# Patient Record
Sex: MALE | Race: BLACK OR AFRICAN AMERICAN | Employment: FULL TIME | URBAN - METROPOLITAN AREA
[De-identification: names, ages, dates, MRNs, and addresses within clinical notes are randomized per-mention and may not be internally consistent; named-entity substitution may affect disease eponyms.]

---

## 2020-05-28 ENCOUNTER — HOSPITAL ENCOUNTER (EMERGENCY)
Age: 45
Discharge: HOME | End: 2020-05-28
Attending: EMERGENCY MEDICINE
Payer: COMMERCIAL

## 2020-05-28 VITALS
HEART RATE: 94 BPM | HEIGHT: 71 IN | DIASTOLIC BLOOD PRESSURE: 80 MMHG | BODY MASS INDEX: 24.99 KG/M2 | OXYGEN SATURATION: 100 % | WEIGHT: 178.5 LBS | SYSTOLIC BLOOD PRESSURE: 136 MMHG | RESPIRATION RATE: 14 BRPM | TEMPERATURE: 98.5 F

## 2020-05-28 PROCEDURE — 99282 EMERGENCY DEPT VISIT SF MDM: CPT

## 2020-05-28 RX ORDER — IBUPROFEN 600 MG/1
600 TABLET ORAL EVERY 8 HOURS PRN
Qty: 20 TABLET | Refills: 0 | Status: SHIPPED | OUTPATIENT
Start: 2020-05-28

## 2020-05-28 RX ORDER — PENICILLIN V POTASSIUM 500 MG/1
500 TABLET ORAL 4 TIMES DAILY
Qty: 40 TABLET | Refills: 0 | Status: SHIPPED | OUTPATIENT
Start: 2020-05-28 | End: 2020-06-07

## 2020-05-28 ASSESSMENT — PAIN DESCRIPTION - PAIN TYPE
TYPE: ACUTE PAIN
TYPE: ACUTE PAIN

## 2020-05-28 ASSESSMENT — PAIN DESCRIPTION - LOCATION
LOCATION: TEETH
LOCATION: TEETH

## 2020-05-28 ASSESSMENT — PAIN SCALES - GENERAL
PAINLEVEL_OUTOF10: 5
PAINLEVEL_OUTOF10: 6

## 2020-05-28 ASSESSMENT — PAIN DESCRIPTION - DESCRIPTORS
DESCRIPTORS: ACHING
DESCRIPTORS: ACHING

## 2020-05-28 NOTE — ED NOTES
Patient given prescription,  discharge instructions verbal and written, patient verbalized understanding. Alert/oriented X4, Clear speech.   Patient exhibits no distress, ambulates with steady gait per self leaving unit, no further request.     Munira Valente RN  05/28/20 9851

## 2020-05-30 NOTE — ED PROVIDER NOTES
TRIAGE CHIEF COMPLAINT:   Chief Complaint   Patient presents with    Dental Pain         HPI: Flakito Preciado is a 39 y.o. male who presents to the Emergency Department with complaint of of a painful left lower molar for 1 week. He states the tooth previously broke off. He does not have a dentist.  Denies fever or chills. Denies earache or sore throat. No neck pain. Denies chest pain or shortness of breath. REVIEW OF SYSTEMS:  6 systems reviewed. Pertinent positives per HPI. Otherwise noted to be negative. Nursing notes reviewed and agree with above. Past medical/surgical history reviewed. MEDICATIONS   Discharge Medication List as of 5/28/2020 12:31 PM      START taking these medications    Details   penicillin v potassium (VEETID) 500 MG tablet Take 1 tablet by mouth 4 times daily for 10 days, Disp-40 tablet, R-0Print      ibuprofen (IBU) 600 MG tablet Take 1 tablet by mouth every 8 hours as needed for Pain or Fever (with food), Disp-20 tablet, R-0Print         CONTINUE these medications which have NOT CHANGED    Details   FLUoxetine HCl (PROZAC PO) Take 50 mg by mouth daily Historical Med               ALLERGIES No Known Allergies      /80   Pulse 94   Temp 98.5 °F (36.9 °C)   Resp 14   Ht 5' 11\" (1.803 m)   Wt 81 kg (178 lb 8 oz)   SpO2 100%   BMI 24.90 kg/m²   General:  No acute distress. Non toxic appearance  Head:   Normocephalic and atraumatic  Eyes:   Conjunctiva clear, TIERRA, EOM's intact. ENT:   No facial swelling or trismus. TMs are normal.  Nose is clear. Oropharynx shows a fracture of the left lower molar with some dental decay. No evidence of abscess. Tooth is tender to percussion. The floor the mouth is normal.  Neck:   Supple. No adenopathy. Lungs/Chest:  No respiratory distress  CVS:   Regular rate and rhythm  Extremities:  Full range of motion  Skin:   No rashes or lesions to exposed skin  Neuro:  Alert and OX3. Speech clear and appropriate.  No extremity

## 2021-02-10 ENCOUNTER — HOSPITAL ENCOUNTER (EMERGENCY)
Age: 46
Discharge: HOME | End: 2021-02-10
Attending: EMERGENCY MEDICINE

## 2021-02-10 VITALS
OXYGEN SATURATION: 100 % | TEMPERATURE: 98.2 F | RESPIRATION RATE: 18 BRPM | SYSTOLIC BLOOD PRESSURE: 144 MMHG | HEART RATE: 127 BPM | BODY MASS INDEX: 27.3 KG/M2 | HEIGHT: 71 IN | WEIGHT: 195 LBS | DIASTOLIC BLOOD PRESSURE: 101 MMHG

## 2021-02-10 DIAGNOSIS — S61.011A LACERATION OF RIGHT THUMB WITHOUT FOREIGN BODY WITHOUT DAMAGE TO NAIL, INITIAL ENCOUNTER: Primary | ICD-10-CM

## 2021-02-10 PROCEDURE — 6360000002 HC RX W HCPCS: Performed by: EMERGENCY MEDICINE

## 2021-02-10 PROCEDURE — 90715 TDAP VACCINE 7 YRS/> IM: CPT | Performed by: EMERGENCY MEDICINE

## 2021-02-10 PROCEDURE — 90471 IMMUNIZATION ADMIN: CPT | Performed by: EMERGENCY MEDICINE

## 2021-02-10 PROCEDURE — 99283 EMERGENCY DEPT VISIT LOW MDM: CPT

## 2021-02-10 PROCEDURE — 12001 RPR S/N/AX/GEN/TRNK 2.5CM/<: CPT

## 2021-02-10 RX ORDER — BACITRACIN ZINC AND POLYMYXIN B SULFATE 500; 1000 [USP'U]/G; [USP'U]/G
OINTMENT TOPICAL
Qty: 1 TUBE | Refills: 1 | Status: SHIPPED | OUTPATIENT
Start: 2021-02-10 | End: 2021-02-17

## 2021-02-10 RX ADMIN — TETANUS TOXOID, REDUCED DIPHTHERIA TOXOID AND ACELLULAR PERTUSSIS VACCINE, ADSORBED 0.5 ML: 5; 2.5; 8; 8; 2.5 SUSPENSION INTRAMUSCULAR at 22:25

## 2021-02-10 ASSESSMENT — PAIN SCALES - GENERAL: PAINLEVEL_OUTOF10: 0

## 2021-02-10 ASSESSMENT — PAIN DESCRIPTION - ORIENTATION: ORIENTATION: RIGHT

## 2021-02-10 ASSESSMENT — PAIN DESCRIPTION - DESCRIPTORS: DESCRIPTORS: ACHING

## 2021-02-10 ASSESSMENT — PAIN DESCRIPTION - LOCATION: LOCATION: FINGER (COMMENT WHICH ONE)

## 2021-02-11 NOTE — ED TRIAGE NOTES
46y/o male with right thumb laceration. Pt states while cutting a callous on his foot the knife slipped and accidentally lacerated his right thumb. +radial pulse strong +blood.

## 2021-02-11 NOTE — ED PROVIDER NOTES
[address] [facility]  eMERGENCY dEPARTMENT eNCOUnter      Pt Name: Hedy Alfredo  MRN: [id number]  [place] 1975  Date of evaluation: 2/10/2021  Provider: Betty Corona MD  PCP: No primary care provider on file. CHIEF COMPLAINT       Chief Complaint   Patient presents with    Laceration     right thumb       HISTORY OFPRESENT ILLNESS   (Location/Symptom, Timing/Onset, Context/Setting, Quality, Duration, Modifying Factors,Severity)  Note limiting factors. Hedy Alfredo is a 39 y.o. male trying to shave down a callus with a sharp tool and inadvertently lacerated the lateral aspect of his right thumb he is right-hand dominant. The patient denies any pain bleeding is well controlled. Tetanus status is unknown    Nursing Notes were all reviewed and agreed with or any disagreements were addressed  in the HPI. REVIEW OF SYSTEMS    (2-9 systems for level 4, 10 or more for level 5)     Review of Systems    Positives and Pertinent negatives as per HPI. Except as noted above in the ROS, all other systems were reviewed andnegative. PASTMEDICAL HISTORY     Past Medical History:   Diagnosis Date    Anxiety     Depression          SURGICAL HISTORY     No past surgical history on file. CURRENT MEDICATIONS       Previous Medications    FLUOXETINE HCL (PROZAC PO)    Take 50 mg by mouth daily     IBUPROFEN (IBU) 600 MG TABLET    Take 1 tablet by mouth every 8 hours as needed for Pain or Fever (with food)       ALLERGIES     Patient has no known allergies. FAMILY HISTORY     No family history on file.        SOCIAL HISTORY       Social History     Socioeconomic History    Marital status:      Spouse name: Not on file    Number of children: Not on file    Years of education: Not on file    Highest education level: Not on file   Occupational History    Not on file   Social Needs    Financial resource strain: Not on file    Food insecurity     Worry: Not on file     Inability: Not on file    Transportation needs     Medical: Not on file     Non-medical: Not on file   Tobacco Use    Smoking status: Current Every Day Smoker    Smokeless tobacco: Never Used   Substance and Sexual Activity    Alcohol use: Yes     Comment: social    Drug use: Not Currently    Sexual activity: Not on file   Lifestyle    Physical activity     Days per week: Not on file     Minutes per session: Not on file    Stress: Not on file   Relationships    Social connections     Talks on phone: Not on file     Gets together: Not on file     Attends [employer] service: Not on file     Active member of club or organization: Not on file     Attends meetings of clubs or organizations: Not on file     Relationship status: Not on file    Intimate partner violence     Fear of current or ex partner: Not on file     Emotionally abused: Not on file     Physically abused: Not on file     Forced sexual activity: Not on file   Other Topics Concern    Not on file   Social History Narrative    Not on file       SCREENINGS      @WKOY([id number])@      PHYSICAL EXAM    (up to 7 for level 4, 8 or more for level 5)     ED Triage Vitals [02/10/21 2156]   BP Temp Temp Source Pulse Resp SpO2 Height Weight   (!) 144/101 98.2 °F (36.8 °C) Oral 127 18 100 % 5' 11\" (1.803 m) 195 lb (88.5 kg)       Physical Exam      General Appearance:  Alert, cooperative, no distress, appears stated age. Head:  Normocephalic, without obviousabnormality, atraumatic. Eyes:  conjunctiva/corneas clear, EOM's intact. Sclera anicteric. ENT: Mucous membranes moist.   Neck: Supple, symmetrical, trachea midline, no adenopathy. No jugular venous distention. Lungs:   Clear to auscultation bilaterally, respirationsunlabored. No rales, rhonchi or wheezes. Chest Wall:  No tenderness. Heart:  Regular rate and rhythm, S1 and S2 normal, no murmur, rub or gallop.    Abdomen:   Soft, non-tender, bowel sounds active,   no masses, no organomegaly. Extremities: No edema, cords or calf tenderness. Full range of motion. Is a V-shaped laceration to the lateral aspect of the right thumb there is no nail involvement total length of the laceration is 2 cm 1 cm on either side of the the   Pulses: 2+ and symmetric   Skin: Turgor is normal, no rashes or lesions. Neurologic: Alert and oriented X 3. No focal findings. Motor grossly normal.  Speech clear, no drift, CN III-XII grossly intact,        DIAGNOSTIC RESULTS   LABS:    Labs Reviewed - No data to display    All other labs were within normal range or not returned as of this dictation. EKG: All EKG's are interpreted by the Emergency Department Physician who eithersigns or Co-signs this chart in the absence of a cardiologist.        RADIOLOGY:   Non-plain film images such as CT, Ultrasound and MRI are read by the radiologist. Plain radiographic images are visualized by myself. *    Interpretation per the Radiologist below, if available at the time of this note:    No orders to display         PROCEDURES   Unless otherwise noted below, none     Procedures  The wound was anesthetized with 1% lidocaine without epinephrine cleansed with Hibiclens five 4-0 Ethilon sutures were placed in a simple interrupted pattern along the 2 cm laceration wound edges were well approximated good hemostasis was obtained the patient tolerated the procedure well  *    CRITICAL CARE TIME   N/A      EMERGENCY DEPARTMENT COURSE and DIFFERENTIALDIAGNOSIS/MDM:   Vitals:    Vitals:    02/10/21 2156   BP: (!) 144/101   Pulse: 127   Resp: 18   Temp: 98.2 °F (36.8 °C)   TempSrc: Oral   SpO2: 100%   Weight: 195 lb (88.5 kg)   Height: 5' 11\" (1.803 m)       Patient was given thefollowing medications:  Medications - No data to display        The patient tolerated their visit well. The patient and / or the familywere informed of the results of any tests, a time was given to answer questions.     FINAL IMPRESSION      1. Laceration of right thumb without foreign body without damage to nail, initial encounter          DISPOSITION/PLAN   DISPOSITION Decision To Discharge 02/10/2021 10:17:38 PM  Sutures out in 5 days    PATIENT REFERRED TO:  [address]  [phone]  In 5 days  For suture removal      DISCHARGE MEDICATIONS:  New Prescriptions    BACITRACIN-POLYMYXIN B (POLYSPORIN) 500-05622 UNIT/GM OINTMENT    Apply topically 2 times daily.        DISCONTINUED MEDICATIONS:  Discontinued Medications    No medications on file              (Please note that portions of this note were completed with a voice recognition program.  Efforts were made to edit the dictations but occasionally words are mis-transcribed.)    Lyndon Silverio MD (electronically signed)      Lyndon Silverio MD  02/10/21 [address] Michael Rodriguez MD  02/10/21 4766

## 2024-06-03 ENCOUNTER — OFFICE VISIT (OUTPATIENT)
Dept: ENT CLINIC | Age: 49
End: 2024-06-03
Payer: COMMERCIAL

## 2024-06-03 VITALS
DIASTOLIC BLOOD PRESSURE: 90 MMHG | TEMPERATURE: 96.9 F | HEART RATE: 96 BPM | BODY MASS INDEX: 27.35 KG/M2 | WEIGHT: 195.4 LBS | HEIGHT: 71 IN | SYSTOLIC BLOOD PRESSURE: 147 MMHG

## 2024-06-03 DIAGNOSIS — J33.8 NASAL SINUS POLYP: ICD-10-CM

## 2024-06-03 DIAGNOSIS — J34.89 NASAL OBSTRUCTION: ICD-10-CM

## 2024-06-03 DIAGNOSIS — J34.2 DEVIATED NASAL SEPTUM: ICD-10-CM

## 2024-06-03 DIAGNOSIS — J34.3 NASAL TURBINATE HYPERTROPHY: Primary | ICD-10-CM

## 2024-06-03 DIAGNOSIS — H65.02 NON-RECURRENT ACUTE SEROUS OTITIS MEDIA OF LEFT EAR: ICD-10-CM

## 2024-06-03 PROCEDURE — 99204 OFFICE O/P NEW MOD 45 MIN: CPT | Performed by: OTOLARYNGOLOGY

## 2024-06-03 PROCEDURE — 31231 NASAL ENDOSCOPY DX: CPT | Performed by: OTOLARYNGOLOGY

## 2024-06-03 RX ORDER — PREDNISONE 10 MG/1
TABLET ORAL
Qty: 38 TABLET | Refills: 0 | Status: SHIPPED | OUTPATIENT
Start: 2024-06-03

## 2024-06-03 RX ORDER — HYDROCHLOROTHIAZIDE 12.5 MG/1
12.5 CAPSULE, GELATIN COATED ORAL DAILY
COMMUNITY
Start: 2024-05-16

## 2024-06-03 RX ORDER — FLUTICASONE PROPIONATE 50 MCG
1 SPRAY, SUSPENSION (ML) NASAL DAILY
COMMUNITY
Start: 2024-04-01

## 2024-06-03 RX ORDER — SULFAMETHOXAZOLE AND TRIMETHOPRIM 400; 80 MG/1; MG/1
1 TABLET ORAL 2 TIMES DAILY
Qty: 42 TABLET | Refills: 0 | Status: SHIPPED | OUTPATIENT
Start: 2024-06-03 | End: 2024-06-24

## 2024-06-03 RX ORDER — BUPROPION HYDROCHLORIDE 300 MG/1
300 TABLET ORAL DAILY
COMMUNITY
Start: 2024-04-01

## 2024-06-03 RX ORDER — MOMETASONE FUROATE 50 UG/1
2 SPRAY, METERED NASAL DAILY
Qty: 1 EACH | Refills: 0 | Status: SHIPPED | OUTPATIENT
Start: 2024-06-03 | End: 2024-07-03

## 2024-06-03 ASSESSMENT — ENCOUNTER SYMPTOMS
SHORTNESS OF BREATH: 0
NAUSEA: 0
RHINORRHEA: 0
COUGH: 0
SORE THROAT: 0
EYE PAIN: 0
VOICE CHANGE: 0
FACIAL SWELLING: 0
SINUS PAIN: 0
SINUS PRESSURE: 0
EYE ITCHING: 0
DIARRHEA: 0
EYE REDNESS: 0
TROUBLE SWALLOWING: 0
CHOKING: 0

## 2024-06-03 NOTE — PROGRESS NOTES
Subjective:      Patient ID: Emre Rodriguez is a 49 y.o. male.    HPI  Chief Complaint   Patient presents with    Left nasal congestion  History of Present Illness:Emre is a(n) 49 y.o. male who presents with 4 week history of left sided nasal congestion and ear fullness. Like water. Has been on two rounds of antibiotics, flonase and oxymetazoline. Just stopped oxymetazoline. Concern as persisting.   Nasal Discharge: none  Nasal Obstruction: Yes left; constant  Post Nasal Drainage: No  Nasal Bleeding: No  Sense of Smell: decreased  Eye Symptoms: none  Previous Treatments: As above     There is no problem list on file for this patient.    No past surgical history on file.  No family history on file.  Social History     Socioeconomic History    Marital status:      Spouse name: Not on file    Number of children: Not on file    Years of education: Not on file    Highest education level: Not on file   Occupational History    Not on file   Tobacco Use    Smoking status: Every Day    Smokeless tobacco: Never   Substance and Sexual Activity    Alcohol use: Yes     Comment: social    Drug use: Not Currently    Sexual activity: Not on file   Other Topics Concern    Not on file   Social History Narrative    Not on file     Social Determinants of Health     Financial Resource Strain: Not on file   Food Insecurity: Not on file   Transportation Needs: Not on file   Physical Activity: Not on file   Stress: Not on file   Social Connections: Not on file   Intimate Partner Violence: Not on file   Housing Stability: Not on file       DRUG/FOOD ALLERGIES: Patient has no known allergies.    CURRENT MEDICATIONS  Prior to Admission medications    Medication Sig Start Date End Date Taking? Authorizing Provider   FLUoxetine HCl (PROZAC PO) Take 50 mg by mouth daily     Provider, MD Hawk   ibuprofen (IBU) 600 MG tablet Take 1 tablet by mouth every 8 hours as needed for Pain or Fever (with food) 5/28/20   Dom Dent MD

## 2024-06-18 ENCOUNTER — HOSPITAL ENCOUNTER (OUTPATIENT)
Dept: CT IMAGING | Age: 49
Discharge: HOME | End: 2024-06-18
Attending: OTOLARYNGOLOGY
Payer: COMMERCIAL

## 2024-06-18 DIAGNOSIS — J34.2 DEVIATED NASAL SEPTUM: ICD-10-CM

## 2024-06-18 DIAGNOSIS — H65.02 NON-RECURRENT ACUTE SEROUS OTITIS MEDIA OF LEFT EAR: ICD-10-CM

## 2024-06-18 DIAGNOSIS — J34.89 NASAL OBSTRUCTION: ICD-10-CM

## 2024-06-18 DIAGNOSIS — J33.8 NASAL SINUS POLYP: ICD-10-CM

## 2024-06-18 DIAGNOSIS — J34.3 NASAL TURBINATE HYPERTROPHY: ICD-10-CM

## 2024-06-18 PROCEDURE — 70486 CT MAXILLOFACIAL W/O DYE: CPT

## 2024-06-19 ENCOUNTER — PREP FOR PROCEDURE (OUTPATIENT)
Dept: ENT CLINIC | Age: 49
End: 2024-06-19

## 2024-06-19 ENCOUNTER — TELEPHONE (OUTPATIENT)
Dept: ENT CLINIC | Age: 49
End: 2024-06-19

## 2024-06-19 ENCOUNTER — OFFICE VISIT (OUTPATIENT)
Dept: ENT CLINIC | Age: 49
End: 2024-06-19
Payer: COMMERCIAL

## 2024-06-19 VITALS — TEMPERATURE: 97.5 F | SYSTOLIC BLOOD PRESSURE: 142 MMHG | DIASTOLIC BLOOD PRESSURE: 83 MMHG | HEART RATE: 101 BPM

## 2024-06-19 DIAGNOSIS — J32.4 CHRONIC PANSINUSITIS: ICD-10-CM

## 2024-06-19 DIAGNOSIS — J33.8 NASAL SINUS POLYP: ICD-10-CM

## 2024-06-19 DIAGNOSIS — J32.4 CHRONIC PANSINUSITIS: Primary | ICD-10-CM

## 2024-06-19 DIAGNOSIS — J34.2 DEVIATED NASAL SEPTUM: ICD-10-CM

## 2024-06-19 DIAGNOSIS — J34.3 NASAL TURBINATE HYPERTROPHY: ICD-10-CM

## 2024-06-19 PROCEDURE — 99214 OFFICE O/P EST MOD 30 MIN: CPT | Performed by: OTOLARYNGOLOGY

## 2024-06-19 RX ORDER — FLUTICASONE PROPIONATE 50 MCG
1 SPRAY, SUSPENSION (ML) NASAL 2 TIMES DAILY
Qty: 6 EACH | Refills: 3 | Status: SHIPPED | OUTPATIENT
Start: 2024-06-19 | End: 2024-06-20 | Stop reason: SINTOL

## 2024-06-19 NOTE — TELEPHONE ENCOUNTER
Called and spoke with Josiah KAY At [facility] for prior authorization for post operative debridement, 69436-09 2 units, is needed.  Per Josiah no PPA needed, must meet medical criteria.  Ref number is [id number] @ [facility] PM.

## 2024-06-20 ENCOUNTER — TELEPHONE (OUTPATIENT)
Dept: ENT CLINIC | Age: 49
End: 2024-06-20

## 2024-06-20 DIAGNOSIS — J33.8 NASAL SINUS POLYP: ICD-10-CM

## 2024-06-20 DIAGNOSIS — J34.3 NASAL TURBINATE HYPERTROPHY: ICD-10-CM

## 2024-06-20 DIAGNOSIS — J34.89 NASAL OBSTRUCTION: ICD-10-CM

## 2024-06-20 DIAGNOSIS — H65.02 NON-RECURRENT ACUTE SEROUS OTITIS MEDIA OF LEFT EAR: ICD-10-CM

## 2024-06-20 DIAGNOSIS — J34.2 DEVIATED NASAL SEPTUM: ICD-10-CM

## 2024-06-20 RX ORDER — MOMETASONE FUROATE 50 UG/1
2 SPRAY, METERED NASAL DAILY
Qty: 3 EACH | Refills: 3 | Status: SHIPPED | OUTPATIENT
Start: 2024-06-20 | End: 2025-06-20

## 2024-06-20 NOTE — TELEPHONE ENCOUNTER
Patient called says his Pharmacy will not fill his Flonase due to the amount ordered by Dr. Price.    Pharmacy is saying Dr. Price ordered 6 Nasal Sprays to last the patient 3 months.    They would like to clarify and the patient has questions. Please give them both a call back, thanks!

## 2024-06-26 RX ORDER — HYDROCHLOROTHIAZIDE 12.5 MG/1
12.5 CAPSULE, GELATIN COATED ORAL DAILY
COMMUNITY

## 2024-06-26 NOTE — PROGRESS NOTES
[facility] PRE-SURGICAL TESTING INSTRUCTIONS                      PRIOR TO PROCEDURE DATE:    1. PLEASE FOLLOW ANY INSTRUCTIONS GIVEN TO YOU PER YOUR SURGEON.      2. Arrange for someone to drive you home and be with you for the first 24 hours after discharge for your safety after your procedure for which you received sedation. Ensure it is someone we can share information with regarding your discharge.     NOTE: At this time ONLY 2 ADULTS may accompany you   One person ENCOURAGED to stay at hospital entire time if outpatient surgery      3. You must contact your surgeon for instructions IF:  You are taking any blood thinners, aspirin, anti-inflammatory or vitamins.  There is a change in your physical condition such as a cold, fever, rash, cuts, sores, or any other infection, especially near your surgical site.    4. Do not drink alcohol the day before or day of your procedure.  Do not use any recreational marijuana at least 24 hours or street drugs (heroin, cocaine) at minimum 5 days prior to your procedure.     5. A Pre-Surgical History and Physical MUST be completed WITHIN 30 DAYS OR LESS prior to your procedure.by your Physician or an Urgent Care        THE DAY OF YOUR PROCEDURE:  1.  Follow instructions for ARRIVAL TIME as DIRECTED BY YOUR SURGEON.     2. Enter the MAIN entrance from [facility] and follow the signs to the free Parking Garage or  Parking (offered free of charge 7 am-5pm).      3. Enter the Main Entrance of the hospital (do not enter from the lower level of the parking garage). Upon entrance, check in with the  at the surgical information desk on your LEFT.   Bring your insurance card and photo ID to register      4. DO NOT EAT ANYTHING 8 hours prior to arrival for surgery.  You may have up to 8 ounces of water 4 hours prior to your arrival for surgery.   NOTE: ALL Gastric, Bariatric & Bowel surgery patients - you MUST follow your surgeon's instructions regarding  you on the day of your procedure.    10. If you use oxygen at home, please bring your oxygen tank with you to hospital..     11. We recommend that valuable personal belongings such as cash, cell phones, e-tablets, or jewelry, be left at home during your stay. The hospital will not be responsible for valuables that are not secured in the hospital safe. However, if your insurance requires a co-pay, you may want to bring a method of payment, i.e., Check or credit card, if you wish to pay your co-pay the day of surgery.      12. If you are to stay overnight, you may bring a bag with personal items. Please have any large items you may need brought in by your family after your arrival to your hospital room.    13. If you have a Living Will or Durable Power of , please bring a copy on the day of your procedure.     How we keep you safe and work to prevent surgical site infections:   1. Health care workers should always check your ID bracelet to verify your name and birth date. You will be asked many times to state your name, date of birth, and allergies.    2. Health care workers should always clean their hands with soap or alcohol gel before providing care to you. It is okay to ask anyone if they cleaned their hands before they touch you.    3. You will be actively involved in verifying the type of procedure you are having and ensuring the correct surgical site. This will be confirmed multiple times prior to your procedure. Do NOT jai your surgery site UNLESS instructed to by your surgeon.     4. When you are in the operating room, your surgical site will be cleansed with a special soap, and in most cases, you will be given an antibiotic before the surgery begins.      What to expect AFTER your procedure?  1. Immediately following your procedure, your will be taken to the PACU for the first phase of your recovery.  Your nurse will help you recover from any potential side effects of anesthesia, such as extreme

## 2024-06-26 NOTE — PROGRESS NOTES
6/26/24 @ 1200 Pt confirms saw PCP Dr. Mei [phone] yesterday 6/25 for PreOp and EKG was done in the office. Pt states will be getting bloodwork ordered by PCP tomorrow 6/27 or Friday 6/28. MD    6/26/24 @ 5741 Spoke to Rosario @ Dr. Mei's office requested EKG to be faxed to PAT# given with readback. Rosario confirms that H&P is still pending PCP ordered labwork results. MD

## 2024-07-01 RX ORDER — SODIUM CHLORIDE 9 MG/ML
INJECTION, SOLUTION INTRAVENOUS PRN
Status: CANCELLED | OUTPATIENT
Start: 2024-07-05

## 2024-07-01 RX ORDER — SODIUM CHLORIDE 0.9 % (FLUSH) 0.9 %
5-40 SYRINGE (ML) INJECTION EVERY 12 HOURS SCHEDULED
Status: CANCELLED | OUTPATIENT
Start: 2024-07-05

## 2024-07-01 RX ORDER — SODIUM CHLORIDE 0.9 % (FLUSH) 0.9 %
5-40 SYRINGE (ML) INJECTION PRN
Status: CANCELLED | OUTPATIENT
Start: 2024-07-05

## 2024-07-01 RX ORDER — OXYMETAZOLINE HYDROCHLORIDE 0.05 G/100ML
2 SPRAY NASAL
Status: CANCELLED | OUTPATIENT
Start: 2024-07-05 | End: 2024-07-05

## 2024-07-03 ENCOUNTER — TELEPHONE (OUTPATIENT)
Dept: ENT CLINIC | Age: 49
End: 2024-07-03

## 2024-07-03 NOTE — TELEPHONE ENCOUNTER
Called and spoke with patient. Surgery is 07/05/2024 with Dr Price at [facility]. Arrival time is 10:00 AM for 12 NOON surgery time. Verbalized understanding.

## 2024-07-05 ENCOUNTER — ANESTHESIA EVENT (OUTPATIENT)
Dept: OPERATING ROOM | Age: 49
End: 2024-07-05
Payer: COMMERCIAL

## 2024-07-05 ENCOUNTER — HOSPITAL ENCOUNTER (OUTPATIENT)
Age: 49
Setting detail: OUTPATIENT SURGERY
Discharge: HOME | End: 2024-07-05
Attending: OTOLARYNGOLOGY | Admitting: OTOLARYNGOLOGY
Payer: COMMERCIAL

## 2024-07-05 ENCOUNTER — ANESTHESIA (OUTPATIENT)
Dept: OPERATING ROOM | Age: 49
End: 2024-07-05
Payer: COMMERCIAL

## 2024-07-05 VITALS
WEIGHT: 195 LBS | DIASTOLIC BLOOD PRESSURE: 96 MMHG | RESPIRATION RATE: 18 BRPM | BODY MASS INDEX: 27.3 KG/M2 | TEMPERATURE: 98.7 F | HEIGHT: 71 IN | OXYGEN SATURATION: 92 % | HEART RATE: 102 BPM | SYSTOLIC BLOOD PRESSURE: 158 MMHG

## 2024-07-05 DIAGNOSIS — J33.8 NASAL SINUS POLYP: ICD-10-CM

## 2024-07-05 DIAGNOSIS — J34.3 NASAL TURBINATE HYPERTROPHY: ICD-10-CM

## 2024-07-05 DIAGNOSIS — J32.4 CHRONIC PANSINUSITIS: ICD-10-CM

## 2024-07-05 DIAGNOSIS — J34.2 DEVIATED NASAL SEPTUM: ICD-10-CM

## 2024-07-05 PROCEDURE — 31259 NSL/SINS NDSC SPHN TISS RMVL: CPT | Performed by: OTOLARYNGOLOGY

## 2024-07-05 PROCEDURE — 31276 NSL/SINS NDSC FRNT TISS RMVL: CPT | Performed by: OTOLARYNGOLOGY

## 2024-07-05 PROCEDURE — 6360000002 HC RX W HCPCS

## 2024-07-05 PROCEDURE — 6360000002 HC RX W HCPCS: Performed by: ANESTHESIOLOGY

## 2024-07-05 PROCEDURE — 3700000001 HC ADD 15 MINUTES (ANESTHESIA): Performed by: OTOLARYNGOLOGY

## 2024-07-05 PROCEDURE — 2580000003 HC RX 258

## 2024-07-05 PROCEDURE — 2580000003 HC RX 258: Performed by: ANESTHESIOLOGY

## 2024-07-05 PROCEDURE — 2700000000 HC OXYGEN THERAPY PER DAY

## 2024-07-05 PROCEDURE — 6370000000 HC RX 637 (ALT 250 FOR IP): Performed by: ANESTHESIOLOGY

## 2024-07-05 PROCEDURE — 2720000010 HC SURG SUPPLY STERILE: Performed by: OTOLARYNGOLOGY

## 2024-07-05 PROCEDURE — 31267 ENDOSCOPY MAXILLARY SINUS: CPT | Performed by: OTOLARYNGOLOGY

## 2024-07-05 PROCEDURE — 7100000011 HC PHASE II RECOVERY - ADDTL 15 MIN: Performed by: OTOLARYNGOLOGY

## 2024-07-05 PROCEDURE — 7100000001 HC PACU RECOVERY - ADDTL 15 MIN: Performed by: OTOLARYNGOLOGY

## 2024-07-05 PROCEDURE — 3600000014 HC SURGERY LEVEL 4 ADDTL 15MIN: Performed by: OTOLARYNGOLOGY

## 2024-07-05 PROCEDURE — 2500000003 HC RX 250 WO HCPCS: Performed by: OTOLARYNGOLOGY

## 2024-07-05 PROCEDURE — 3700000000 HC ANESTHESIA ATTENDED CARE: Performed by: OTOLARYNGOLOGY

## 2024-07-05 PROCEDURE — 7100000000 HC PACU RECOVERY - FIRST 15 MIN: Performed by: OTOLARYNGOLOGY

## 2024-07-05 PROCEDURE — 2580000003 HC RX 258: Performed by: OTOLARYNGOLOGY

## 2024-07-05 PROCEDURE — 6370000000 HC RX 637 (ALT 250 FOR IP): Performed by: OTOLARYNGOLOGY

## 2024-07-05 PROCEDURE — 6360000002 HC RX W HCPCS: Performed by: OTOLARYNGOLOGY

## 2024-07-05 PROCEDURE — 2500000003 HC RX 250 WO HCPCS

## 2024-07-05 PROCEDURE — A4217 STERILE WATER/SALINE, 500 ML: HCPCS | Performed by: OTOLARYNGOLOGY

## 2024-07-05 PROCEDURE — 88305 TISSUE EXAM BY PATHOLOGIST: CPT

## 2024-07-05 PROCEDURE — 94761 N-INVAS EAR/PLS OXIMETRY MLT: CPT

## 2024-07-05 PROCEDURE — 3600000004 HC SURGERY LEVEL 4 BASE: Performed by: OTOLARYNGOLOGY

## 2024-07-05 PROCEDURE — 7100000010 HC PHASE II RECOVERY - FIRST 15 MIN: Performed by: OTOLARYNGOLOGY

## 2024-07-05 PROCEDURE — 94640 AIRWAY INHALATION TREATMENT: CPT

## 2024-07-05 PROCEDURE — 30140 RESECT INFERIOR TURBINATE: CPT | Performed by: OTOLARYNGOLOGY

## 2024-07-05 PROCEDURE — 30520 REPAIR OF NASAL SEPTUM: CPT | Performed by: OTOLARYNGOLOGY

## 2024-07-05 PROCEDURE — 2709999900 HC NON-CHARGEABLE SUPPLY: Performed by: OTOLARYNGOLOGY

## 2024-07-05 RX ORDER — ONDANSETRON 2 MG/ML
4 INJECTION INTRAMUSCULAR; INTRAVENOUS
Status: DISCONTINUED | OUTPATIENT
Start: 2024-07-05 | End: 2024-07-05 | Stop reason: HOSPADM

## 2024-07-05 RX ORDER — DEXAMETHASONE SODIUM PHOSPHATE 4 MG/ML
4 INJECTION, SOLUTION INTRA-ARTICULAR; INTRALESIONAL; INTRAMUSCULAR; INTRAVENOUS; SOFT TISSUE ONCE
Status: CANCELLED | OUTPATIENT
Start: 2024-07-05 | End: 2024-07-05

## 2024-07-05 RX ORDER — EPINEPHRINE NASAL SOLUTION 1 MG/ML
SOLUTION NASAL PRN
Status: DISCONTINUED | OUTPATIENT
Start: 2024-07-05 | End: 2024-07-05 | Stop reason: HOSPADM

## 2024-07-05 RX ORDER — OXYMETAZOLINE HYDROCHLORIDE 0.05 G/100ML
SPRAY NASAL PRN
Status: DISCONTINUED | OUTPATIENT
Start: 2024-07-05 | End: 2024-07-05 | Stop reason: HOSPADM

## 2024-07-05 RX ORDER — SODIUM CHLORIDE 0.9 % (FLUSH) 0.9 %
5-40 SYRINGE (ML) INJECTION EVERY 12 HOURS SCHEDULED
Status: DISCONTINUED | OUTPATIENT
Start: 2024-07-05 | End: 2024-07-05 | Stop reason: HOSPADM

## 2024-07-05 RX ORDER — SODIUM CHLORIDE 0.9 % (FLUSH) 0.9 %
5-40 SYRINGE (ML) INJECTION PRN
Status: DISCONTINUED | OUTPATIENT
Start: 2024-07-05 | End: 2024-07-05 | Stop reason: HOSPADM

## 2024-07-05 RX ORDER — PROPOFOL 10 MG/ML
INJECTION, EMULSION INTRAVENOUS PRN
Status: DISCONTINUED | OUTPATIENT
Start: 2024-07-05 | End: 2024-07-05 | Stop reason: SDUPTHER

## 2024-07-05 RX ORDER — LIDOCAINE HYDROCHLORIDE 20 MG/ML
INJECTION, SOLUTION INFILTRATION; PERINEURAL PRN
Status: DISCONTINUED | OUTPATIENT
Start: 2024-07-05 | End: 2024-07-05 | Stop reason: SDUPTHER

## 2024-07-05 RX ORDER — NALOXONE HYDROCHLORIDE 0.4 MG/ML
INJECTION, SOLUTION INTRAMUSCULAR; INTRAVENOUS; SUBCUTANEOUS PRN
Status: DISCONTINUED | OUTPATIENT
Start: 2024-07-05 | End: 2024-07-05 | Stop reason: HOSPADM

## 2024-07-05 RX ORDER — HYDROMORPHONE HYDROCHLORIDE 2 MG/ML
INJECTION, SOLUTION INTRAMUSCULAR; INTRAVENOUS; SUBCUTANEOUS PRN
Status: DISCONTINUED | OUTPATIENT
Start: 2024-07-05 | End: 2024-07-05 | Stop reason: SDUPTHER

## 2024-07-05 RX ORDER — LABETALOL HYDROCHLORIDE 5 MG/ML
10 INJECTION, SOLUTION INTRAVENOUS
Status: DISCONTINUED | OUTPATIENT
Start: 2024-07-05 | End: 2024-07-05 | Stop reason: HOSPADM

## 2024-07-05 RX ORDER — MEPERIDINE HYDROCHLORIDE 25 MG/ML
12.5 INJECTION INTRAMUSCULAR; INTRAVENOUS; SUBCUTANEOUS EVERY 5 MIN PRN
Status: DISCONTINUED | OUTPATIENT
Start: 2024-07-05 | End: 2024-07-05 | Stop reason: HOSPADM

## 2024-07-05 RX ORDER — MAGNESIUM HYDROXIDE 1200 MG/15ML
LIQUID ORAL CONTINUOUS PRN
Status: DISCONTINUED | OUTPATIENT
Start: 2024-07-05 | End: 2024-07-05 | Stop reason: HOSPADM

## 2024-07-05 RX ORDER — LIDOCAINE HYDROCHLORIDE AND EPINEPHRINE 10; 10 MG/ML; UG/ML
INJECTION, SOLUTION INFILTRATION; PERINEURAL PRN
Status: DISCONTINUED | OUTPATIENT
Start: 2024-07-05 | End: 2024-07-05 | Stop reason: ALTCHOICE

## 2024-07-05 RX ORDER — ESMOLOL HYDROCHLORIDE 10 MG/ML
INJECTION INTRAVENOUS PRN
Status: DISCONTINUED | OUTPATIENT
Start: 2024-07-05 | End: 2024-07-05 | Stop reason: SDUPTHER

## 2024-07-05 RX ORDER — OXYMETAZOLINE HYDROCHLORIDE 0.05 G/100ML
2 SPRAY NASAL
Status: COMPLETED | OUTPATIENT
Start: 2024-07-05 | End: 2024-07-05

## 2024-07-05 RX ORDER — SUCCINYLCHOLINE/SOD CL,ISO/PF 100 MG/5ML
SYRINGE (ML) INTRAVENOUS PRN
Status: DISCONTINUED | OUTPATIENT
Start: 2024-07-05 | End: 2024-07-05 | Stop reason: SDUPTHER

## 2024-07-05 RX ORDER — GLYCOPYRROLATE 0.2 MG/ML
INJECTION INTRAMUSCULAR; INTRAVENOUS PRN
Status: DISCONTINUED | OUTPATIENT
Start: 2024-07-05 | End: 2024-07-05 | Stop reason: SDUPTHER

## 2024-07-05 RX ORDER — ROCURONIUM BROMIDE 10 MG/ML
INJECTION, SOLUTION INTRAVENOUS PRN
Status: DISCONTINUED | OUTPATIENT
Start: 2024-07-05 | End: 2024-07-05 | Stop reason: SDUPTHER

## 2024-07-05 RX ORDER — SODIUM CHLORIDE 9 MG/ML
INJECTION, SOLUTION INTRAVENOUS PRN
Status: DISCONTINUED | OUTPATIENT
Start: 2024-07-05 | End: 2024-07-05 | Stop reason: HOSPADM

## 2024-07-05 RX ORDER — TRIAMCINOLONE ACETONIDE 40 MG/ML
INJECTION, SUSPENSION INTRA-ARTICULAR; INTRAMUSCULAR PRN
Status: DISCONTINUED | OUTPATIENT
Start: 2024-07-05 | End: 2024-07-05 | Stop reason: HOSPADM

## 2024-07-05 RX ORDER — SODIUM CHLORIDE FOR INHALATION 0.9 %
3 VIAL, NEBULIZER (ML) INHALATION EVERY 4 HOURS PRN
Status: DISCONTINUED | OUTPATIENT
Start: 2024-07-05 | End: 2024-07-05 | Stop reason: HOSPADM

## 2024-07-05 RX ORDER — HYDROMORPHONE HYDROCHLORIDE 1 MG/ML
0.5 INJECTION, SOLUTION INTRAMUSCULAR; INTRAVENOUS; SUBCUTANEOUS EVERY 5 MIN PRN
Status: DISCONTINUED | OUTPATIENT
Start: 2024-07-05 | End: 2024-07-05 | Stop reason: HOSPADM

## 2024-07-05 RX ORDER — BACITRACIN ZINC AND POLYMYXIN B SULFATE 500; 1000 [USP'U]/G; [USP'U]/G
OINTMENT TOPICAL PRN
Status: DISCONTINUED | OUTPATIENT
Start: 2024-07-05 | End: 2024-07-05 | Stop reason: HOSPADM

## 2024-07-05 RX ORDER — DEXAMETHASONE SODIUM PHOSPHATE 4 MG/ML
INJECTION, SOLUTION INTRA-ARTICULAR; INTRALESIONAL; INTRAMUSCULAR; INTRAVENOUS; SOFT TISSUE PRN
Status: DISCONTINUED | OUTPATIENT
Start: 2024-07-05 | End: 2024-07-05 | Stop reason: SDUPTHER

## 2024-07-05 RX ORDER — OXYCODONE HYDROCHLORIDE 5 MG/1
5 TABLET ORAL EVERY 6 HOURS PRN
Qty: 10 TABLET | Refills: 0 | Status: SHIPPED | OUTPATIENT
Start: 2024-07-05 | End: 2024-07-08

## 2024-07-05 RX ORDER — MIDAZOLAM HYDROCHLORIDE 1 MG/ML
INJECTION INTRAMUSCULAR; INTRAVENOUS PRN
Status: DISCONTINUED | OUTPATIENT
Start: 2024-07-05 | End: 2024-07-05 | Stop reason: SDUPTHER

## 2024-07-05 RX ORDER — SODIUM CHLORIDE, SODIUM LACTATE, POTASSIUM CHLORIDE, CALCIUM CHLORIDE 600; 310; 30; 20 MG/100ML; MG/100ML; MG/100ML; MG/100ML
INJECTION, SOLUTION INTRAVENOUS CONTINUOUS
Status: DISCONTINUED | OUTPATIENT
Start: 2024-07-05 | End: 2024-07-05 | Stop reason: HOSPADM

## 2024-07-05 RX ORDER — PROCHLORPERAZINE EDISYLATE 5 MG/ML
5 INJECTION INTRAMUSCULAR; INTRAVENOUS
Status: DISCONTINUED | OUTPATIENT
Start: 2024-07-05 | End: 2024-07-05 | Stop reason: HOSPADM

## 2024-07-05 RX ORDER — HYDRALAZINE HYDROCHLORIDE 20 MG/ML
10 INJECTION INTRAMUSCULAR; INTRAVENOUS
Status: DISCONTINUED | OUTPATIENT
Start: 2024-07-05 | End: 2024-07-05 | Stop reason: HOSPADM

## 2024-07-05 RX ORDER — FENTANYL CITRATE 50 UG/ML
INJECTION, SOLUTION INTRAMUSCULAR; INTRAVENOUS PRN
Status: DISCONTINUED | OUTPATIENT
Start: 2024-07-05 | End: 2024-07-05 | Stop reason: SDUPTHER

## 2024-07-05 RX ORDER — ONDANSETRON 2 MG/ML
INJECTION INTRAMUSCULAR; INTRAVENOUS PRN
Status: DISCONTINUED | OUTPATIENT
Start: 2024-07-05 | End: 2024-07-05 | Stop reason: SDUPTHER

## 2024-07-05 RX ORDER — OXYCODONE HYDROCHLORIDE 5 MG/1
5 TABLET ORAL
Status: COMPLETED | OUTPATIENT
Start: 2024-07-05 | End: 2024-07-05

## 2024-07-05 RX ADMIN — MIDAZOLAM HYDROCHLORIDE 2 MG: 2 INJECTION, SOLUTION INTRAMUSCULAR; INTRAVENOUS at 12:40

## 2024-07-05 RX ADMIN — HYDROMORPHONE HYDROCHLORIDE 0.5 MG: 2 INJECTION, SOLUTION INTRAMUSCULAR; INTRAVENOUS; SUBCUTANEOUS at 14:19

## 2024-07-05 RX ADMIN — LIDOCAINE HYDROCHLORIDE 100 MG: 20 INJECTION, SOLUTION INFILTRATION; PERINEURAL at 12:45

## 2024-07-05 RX ADMIN — DEXMEDETOMIDINE HYDROCHLORIDE 4 MCG: 100 INJECTION, SOLUTION INTRAVENOUS at 13:10

## 2024-07-05 RX ADMIN — PROPOFOL 200 MG: 10 INJECTION, EMULSION INTRAVENOUS at 12:45

## 2024-07-05 RX ADMIN — DEXAMETHASONE SODIUM PHOSPHATE 8 MG: 4 INJECTION INTRA-ARTICULAR; INTRALESIONAL; INTRAMUSCULAR; INTRAVENOUS; SOFT TISSUE at 13:22

## 2024-07-05 RX ADMIN — GLYCOPYRROLATE 0.2 MG: 0.2 INJECTION INTRAMUSCULAR; INTRAVENOUS at 15:09

## 2024-07-05 RX ADMIN — ROCURONIUM BROMIDE 20 MG: 10 INJECTION, SOLUTION INTRAVENOUS at 14:01

## 2024-07-05 RX ADMIN — ROCURONIUM BROMIDE 50 MG: 10 INJECTION, SOLUTION INTRAVENOUS at 13:00

## 2024-07-05 RX ADMIN — SODIUM CHLORIDE, POTASSIUM CHLORIDE, SODIUM LACTATE AND CALCIUM CHLORIDE: 600; 310; 30; 20 INJECTION, SOLUTION INTRAVENOUS at 14:00

## 2024-07-05 RX ADMIN — OXYCODONE 5 MG: 5 TABLET ORAL at 15:48

## 2024-07-05 RX ADMIN — OXYMETAZOLINE HCL 2 SPRAY: 0.05 SPRAY NASAL at 10:45

## 2024-07-05 RX ADMIN — SUGAMMADEX 200 MG: 100 INJECTION, SOLUTION INTRAVENOUS at 14:39

## 2024-07-05 RX ADMIN — LIDOCAINE HYDROCHLORIDE 100 MG: 20 INJECTION, SOLUTION INFILTRATION; PERINEURAL at 14:39

## 2024-07-05 RX ADMIN — LABETALOL HYDROCHLORIDE 10 MG: 5 INJECTION, SOLUTION INTRAVENOUS at 15:21

## 2024-07-05 RX ADMIN — OXYMETAZOLINE HCL 2 SPRAY: 0.05 SPRAY NASAL at 10:38

## 2024-07-05 RX ADMIN — Medication 60 MG: at 14:56

## 2024-07-05 RX ADMIN — OXYMETAZOLINE HCL 2 SPRAY: 0.05 SPRAY NASAL at 10:51

## 2024-07-05 RX ADMIN — HYDROMORPHONE HYDROCHLORIDE 0.5 MG: 2 INJECTION, SOLUTION INTRAMUSCULAR; INTRAVENOUS; SUBCUTANEOUS at 14:41

## 2024-07-05 RX ADMIN — FENTANYL CITRATE 100 MCG: 50 INJECTION, SOLUTION INTRAMUSCULAR; INTRAVENOUS at 13:03

## 2024-07-05 RX ADMIN — PROPOFOL 50 MG: 10 INJECTION, EMULSION INTRAVENOUS at 14:55

## 2024-07-05 RX ADMIN — DEXMEDETOMIDINE HYDROCHLORIDE 4 MCG: 100 INJECTION, SOLUTION INTRAVENOUS at 13:15

## 2024-07-05 RX ADMIN — RACEPINEPHRINE HYDROCHLORIDE 0.5 ML: 11.25 SOLUTION RESPIRATORY (INHALATION) at 15:16

## 2024-07-05 RX ADMIN — DEXMEDETOMIDINE HYDROCHLORIDE 4 MCG: 100 INJECTION, SOLUTION INTRAVENOUS at 13:25

## 2024-07-05 RX ADMIN — HYDROMORPHONE HYDROCHLORIDE 0.5 MG: 2 INJECTION, SOLUTION INTRAMUSCULAR; INTRAVENOUS; SUBCUTANEOUS at 13:22

## 2024-07-05 RX ADMIN — GLYCOPYRROLATE 0.4 MG: 0.2 INJECTION INTRAMUSCULAR; INTRAVENOUS at 13:10

## 2024-07-05 RX ADMIN — HYDROMORPHONE HYDROCHLORIDE 0.5 MG: 2 INJECTION, SOLUTION INTRAMUSCULAR; INTRAVENOUS; SUBCUTANEOUS at 14:31

## 2024-07-05 RX ADMIN — PROPOFOL 50 MG: 10 INJECTION, EMULSION INTRAVENOUS at 14:42

## 2024-07-05 RX ADMIN — ESMOLOL HYDROCHLORIDE 20 MG: 10 INJECTION, SOLUTION INTRAVENOUS at 15:09

## 2024-07-05 RX ADMIN — ROCURONIUM BROMIDE 45 MG: 10 INJECTION, SOLUTION INTRAVENOUS at 12:50

## 2024-07-05 RX ADMIN — PROPOFOL 50 MG: 10 INJECTION, EMULSION INTRAVENOUS at 14:39

## 2024-07-05 RX ADMIN — MIDAZOLAM HYDROCHLORIDE 2 MG: 2 INJECTION, SOLUTION INTRAMUSCULAR; INTRAVENOUS at 12:54

## 2024-07-05 RX ADMIN — Medication 140 MG: at 12:45

## 2024-07-05 RX ADMIN — ROCURONIUM BROMIDE 5 MG: 10 INJECTION, SOLUTION INTRAVENOUS at 12:45

## 2024-07-05 RX ADMIN — SODIUM CHLORIDE, POTASSIUM CHLORIDE, SODIUM LACTATE AND CALCIUM CHLORIDE: 600; 310; 30; 20 INJECTION, SOLUTION INTRAVENOUS at 10:53

## 2024-07-05 RX ADMIN — DEXMEDETOMIDINE HYDROCHLORIDE 4 MCG: 100 INJECTION, SOLUTION INTRAVENOUS at 13:30

## 2024-07-05 RX ADMIN — DEXMEDETOMIDINE HYDROCHLORIDE 4 MCG: 100 INJECTION, SOLUTION INTRAVENOUS at 13:20

## 2024-07-05 RX ADMIN — ONDANSETRON 4 MG: 2 INJECTION INTRAMUSCULAR; INTRAVENOUS at 13:22

## 2024-07-05 ASSESSMENT — PAIN SCALES - GENERAL
PAINLEVEL_OUTOF10: 5
PAINLEVEL_OUTOF10: 0

## 2024-07-05 ASSESSMENT — PAIN - FUNCTIONAL ASSESSMENT
PAIN_FUNCTIONAL_ASSESSMENT: NONE - DENIES PAIN
PAIN_FUNCTIONAL_ASSESSMENT: 0-10

## 2024-07-05 ASSESSMENT — PAIN DESCRIPTION - DESCRIPTORS: DESCRIPTORS: DISCOMFORT

## 2024-07-05 NOTE — PROGRESS NOTES
Racephenephrine nebulizer treatment completed- lungs clear.   O2 saturation 100 % on room air.     VS stale.

## 2024-07-05 NOTE — DISCHARGE INSTRUCTIONS
We care about your health!Discharge Instructions for Functional Endoscopic Sinus Surgery    Functional endoscopic sinus surgery is a procedure to enlarge pathways to the sinuses. It is done to improve sinus drainage and may help to treat recurring sinus problems and infections. Recovery from this surgery takes about 1 weeks.     Steps to Take  Home Care   While your sinuses are healing:   Do not blow your nose until your doctor says it is okay to do so. This is usually after 48 hours.  You may have bloody discharge from your nose. Create a drip pad using rolls of gauze. Hold the roll under your nose to soak up any discharge.  Avoid air pollutants like dust and smoke.  Physical Activity   During your recovery:   Avoid swimming in oceans and lakes for 2 weeks.  Get plenty of rest for at least a 2-3 days.  If your job involves heavy lifting, you may need to stay out of work up to 1 week.  Ask your doctor when you will be able to return to work.  Do not drive unless your doctor has given you permission to do so.  Medications   Your doctor may advise:   Over-the-counter pain medication.   Saline spray to moisturize the nose and clear nasal crusting. Two sprays every two hours while awake.   Afrin nasal spray. Two sprays each nostril three times a day for three days then stop.     Follow these general medication guidelines:   Take your medication as directed. Do not change the amount or schedule.  Tylenol 1000 mg (two 500 mg tablets) by mouth every 6 hours for pain. Ibuprofen (Advil or Motrin) 600 mg (three 200 mg tablets) by mouth every 6 hours for pain. Alternate these two medications every three hours.   Oxycodone, 5 mg tablets. Take one tablet every 6 hours as needed for pain.   Ask what side effects could occur. Report them to your doctor.  Talk to your doctor before you stop taking the medication.  Do not share your medication with anyone.  Medications can be dangerous when mixed. Talk to your doctor if you are  taking more than one medication, including over-the-counter products and supplements.    If you had to stop medications before surgery, ask your doctor when you can start again.  Follow-up  You will need to be seen by your doctor 1week after surgery. If you had splints put in your nose during surgery, they will be taken out at your follow-up visit.   It is important to go to any recommended appointments.  Call Your Doctor If Any of the Following Occur ([phone])  Contact your doctor if your recovery is not progressing as expected or you develop complications, such as:  Signs of infection, including fever and chills  Pain that you cannot control with the medications that you have been given  Redness, swelling, increasing pain, excessive bleeding, or discharge from the nose  Lightheadedness  Nausea and vomiting  Vomiting blood or material that looks like coffee grounds  Bruising around the eye(s)  Swelling of the eye(s)  Changes in vision  A headache that lasts longer than 2 days after surgery    If you think you have an emergency, call for medical help right away.     [facility] AMBULATORY PROCEDURE DISCHARGE INSTRUCTIONS    There are potential side effects of anesthesia or sedation you may experience for the first 24 hours.  These side effects include:    Confusion or Memory loss, Dizziness, or Delayed Reaction Times   [x]A responsible person should be with you for the next 24 hours.  Do not operate any vehicles (automobiles, bicycles, motorcycles) or power tools or machinery for 24 hours.  Do not sign any legal documents or make any legal decisions for 24 hours. Do not drink alcohol for 24 hours or while taking narcotic pain medication.      Nausea    [x]Start with light diet and progress to your normal diet as you feel like eating. However, if you experience nausea or repeated episodes of vomiting which persist beyond 12-24 hours, notify your physician.  Once nausea has passed, remember to keep drinking

## 2024-07-05 NOTE — OP NOTE
Operative Note      Patient Name: Emre Rodriguez  YOB: 1975  Medical Record Number:  [id number]  Billing Number:  [id number]  Date of Procedure: 7/5/2024  Time: 1214    Brief History: This is a 48 y/o male with chronic pansinusitis, nasal obstruction, deviated septum and turbinate hypertrophy  Pre-operative Diagnosis: Same    Post-operative Diagnosis: Same  Proc:  1.Bilateral nasal endoscopy with maxillary antrosotomy with removal of tissue (64750-53)   2.Bilateral nasal endoscopy with total spheno-ethmoidectomy with removal of tissue (70041-88)   3.Bilateral nasal endoscopy with frontal sinus exploration (88498-93)   4.Septoplasty (03116)   5. Submucosal resection inferior turbinates, bilateral (14253-33)    Circulator: Dylan Fall RN  Relief Circulator: Sue Sheppard RN  Scrub Person First: Grazyna Daigle  Circulator Assist: Cheyenne Snell RN    Anesthesia: 1. 10 cc 1% lidocaine with 1:100,000 epinephrine injected in the uncinate  and middle turbinate bilateral  EBL: 300 ml  Specimens: Bilateral sinus contents  Findings: Diffuse sinonasal polyps, deviated septum and turbinate hypertrophy Complications: none  Antibiotics: none    After consent was confirmed the patient came into the operating room and was placed in supine position. General IV anesthesia was obtained and the patient intubated by Anesthesiology without difficulty.  The table was turned and 10 cc's of 1% lidocaine with 1:100,000 epinephrine was injected into the uncinate, middle turbinate and anterior wall of the sphenoid sinus.  Surgery began on the left nasal cavity. Using a zero degree endoscope and and a caudal elevator the middle turbinate was medialized in its inferior third. The uncinate was in fractured with a frontal sinus seeker. A Abdirahman's window was made with a back biting forceps. The uncinate was removed with a 4mm, 0 degree straight shot micro debrider in its entirety from the inferior turbinate to

## 2024-07-05 NOTE — PROGRESS NOTES
Ambulatory Surgery/Procedure Discharge Note    Vitals:    07/05/24 1604   BP: (!) 158/96   Pulse: (!) 102   Resp: 18   Temp: 98.7 °F (37.1 °C)   SpO2: 92%   Patient meets discharge criteria per Shi score.     In: 2240 [P.O.:240; I.V.:2000]  Out: 30 Voided in SDS    Restroom use offered before discharge.  Yes    Pain assessment:  present - adequately treated  Pain Level: 2 Oxycodone given in PACU    Pt and S.O./family states \"ready to go home\". Pt alert and oriented x4. IV removed. Denies N/V.   Voided prior to discharge. Scant amount of bright red blood noted from nares.  Mustache dressing in place. Pt tolerating po intake. Discharge instructions given to pt, wife and mother with pt permission. Pt wife and mother verbalized understanding of all instructions. Left with all belongings, 1 prescriptions, and discharge instructions.         Patient discharged to home/self care. Patient discharged via wheel chair by transporter to waiting family/S.O.       7/5/2024 4:21 PM

## 2024-07-05 NOTE — BRIEF OP NOTE
Brief Postoperative Note      Patient: Emre Rodriguez  YOB: 1975  MRN: [id number]    Date of Procedure: 7/5/2024    Pre-Op Diagnosis Codes:     * Chronic pansinusitis [J32.4]     * Nasal turbinate hypertrophy [J34.3]     * Deviated nasal septum [J34.2]     * Nasal sinus polyp [J33.8]    Post-Op Diagnosis: Same       Procedure(s):  BILATERAL FRONTAL SINUS EXPLORATION, BILATERAL INFERIOR TURBINATE REDUCTION, BILATERAL MAXILLARY ANTROSTOMY WITH TISSUE REMOVAL, BILATERAL TOTAL ETHMOIDECTOMY WITH SPHENOIDECTOMY AND SEPTOPLASTY, BILATERAL    Surgeon(s):  Matthew Price MD    Assistant:  * No surgical staff found *    Anesthesia: General    Estimated Blood Loss (mL): 300     Complications: None    Specimens:   ID Type Source Tests Collected by Time Destination   A : SINUS CONTENTS Tissue Tissue SURGICAL PATHOLOGY Matthew Price MD 7/5/2024 1424        Implants:  * No implants in log *      Drains: * No LDAs found *    Findings:  Infection Present At Time Of Surgery (PATOS) (choose all levels that have infection present):  No infection present  Other Findings: Deviated septum, turbinate hypertrophy, diffuse sinonasal polyps    Electronically signed by Matthew Price MD on 7/5/2024 at 2:42 PM

## 2024-07-05 NOTE — PROGRESS NOTES
PACU Transfer to [facility]    Vitals:    07/05/24 1556   BP: (!) 167/98   Pulse: (!) 105 BP and Pulse WNL for patient    Resp: 15   Temp: 99.2 °F (37.3 °C)   SpO2: 92         Intake/Output Summary (Last 24 hours) at 7/5/2024 1559  Last data filed at 7/5/2024 1556  Gross per 24 hour   Intake 2240 ml   Output 30 ml   Net 2210 ml       Pain assessment:  present - adequately treated  Pain Level: 5    Patient transferred to care of ROSE MARY RN.    7/5/2024 3:59 PM

## 2024-07-05 NOTE — PROGRESS NOTES
From OR to PACU.   Post op   Date: 07/05/24 Room / Location: [facility] OR 06 / [facility]   Anesthesia Start: 1240 Anesthesia Stop: 1514   Procedure: BILATERAL FRONTAL SINUS EXPLORATION, BILATERAL INFERIOR TURBINATE REDUCTION, BILATERAL MAXILLARY ANTROSTOMY WITH TISSUE REMOVAL, BILATERAL TOTAL ETHMOIDECTOMY WITH SPHENOIDECTOMY AND SEPTOPLASTY, BILATERAL (Bilateral: Nose) Diagnosis:      Chronic pansinusitis      Nasal turbinate hypertrophy      Deviated nasal septum      Nasal sinus polyp      (Chronic pansinusitis [J32.4])      (Nasal turbinate hypertrophy [J34.3])      (Deviated nasal septum [J34.2])      (Nasal sinus polyp [J33.8])   Surgeons: Matthew Price MD Responsible Provider: Justyn Perrin MD   Anesthesia Type: Not recorded ASA Status: 2   Report received from CRNA and Dr. García at bedside.   Patient on arrival to PACU- stridor in upper airways, called respiratory therapist for breathing treatment as ordered.   Patient drowsy, follows commands   Denies any pain or nausea.   Naires with no draingae noted.     Dr. Price at bedside- spoke to patient.

## 2024-07-05 NOTE — H&P
Update History & Physical     The patient's History and Physical of 6-25-24 was reviewed with the patient and I examined the patient. There was no change. The surgical site was confirmed by the patient and me.      Plan: The risks, benefits, expected outcome, and alternative to the recommended procedure have been discussed with the patient. Patient understands and wants to proceed with the procedure.

## 2024-07-05 NOTE — ANESTHESIA PRE PROCEDURE
Department of Anesthesiology  Preprocedure Note       Name:  Emre Rodriguez   Age:  49 y.o.  :  1975                                          MRN:  [id number]         Date:  2024      Surgeon: Surgeon(s):  Matthew Price MD    Procedure: Procedure(s):  BILATERAL FRONTAL SINUS EXPLORATION, BILATERAL INFERIOR TURBINATE REDUCTION, BILATERAL MAXILLARY ANTROSTOMY WITH TISSUE REMOVAL, BILATERAL TOTAL ETHMOIDECTOMY WITH SPHENOIDECTOMY AND SEPTOPLASTY, BILATERAL    Medications prior to admission:   Prior to Admission medications    Medication Sig Start Date End Date Taking? Authorizing Provider   hydroCHLOROthiazide 12.5 MG capsule Take 1 capsule by mouth daily   Yes Hawk Colón MD   mometasone (NASONEX) 50 MCG/ACT nasal spray 2 sprays by Nasal route daily 24  Matthew Price MD   VITAMIN D, ERGOCALCIFEROL, PO Take 50,000 Units by mouth once a week 3/3/17   Hawk Colón MD   buPROPion (WELLBUTRIN XL) 300 MG extended release tablet Take 1 tablet by mouth daily 24   Hawk Colón MD   ibuprofen (IBU) 600 MG tablet Take 1 tablet by mouth every 8 hours as needed for Pain or Fever (with food) 20   Dom Dent MD       Current medications:    Current Facility-Administered Medications   Medication Dose Route Frequency Provider Last Rate Last Admin   • lactated ringers IV soln infusion   IntraVENous Continuous Justyn Perrin MD       • sodium chloride flush 0.9 % injection 5-40 mL  5-40 mL IntraVENous 2 times per day Matthew Price MD       • sodium chloride flush 0.9 % injection 5-40 mL  5-40 mL IntraVENous PRN Matthew Price MD       • 0.9 % sodium chloride infusion   IntraVENous PRN Matthew Price MD           Allergies:  No Known Allergies    Problem List:    Patient Active Problem List   Diagnosis Code   • Chronic pansinusitis J32.4   • Nasal turbinate hypertrophy J34.3   • Deviated nasal septum J34.2   • Nasal sinus

## 2024-07-05 NOTE — ANESTHESIA POSTPROCEDURE EVALUATION
Department of Anesthesiology  Postprocedure Note    Patient: Emre Rodriguez  MRN: [id number]  YOB: 1975  Date of evaluation: 7/5/2024    Procedure Summary       Date: 07/05/24 Room / Location: [address]    Anesthesia Start: 1240 Anesthesia Stop: 1514    Procedure: BILATERAL FRONTAL SINUS EXPLORATION, BILATERAL INFERIOR TURBINATE REDUCTION, BILATERAL MAXILLARY ANTROSTOMY WITH TISSUE REMOVAL, BILATERAL TOTAL ETHMOIDECTOMY WITH SPHENOIDECTOMY AND SEPTOPLASTY, BILATERAL (Bilateral: Nose) Diagnosis:       Chronic pansinusitis      Nasal turbinate hypertrophy      Deviated nasal septum      Nasal sinus polyp      (Chronic pansinusitis [J32.4])      (Nasal turbinate hypertrophy [J34.3])      (Deviated nasal septum [J34.2])      (Nasal sinus polyp [J33.8])    Surgeons: Matthew Price MD Responsible Provider: Justyn Perrin MD    Anesthesia Type: general ASA Status: 2            Anesthesia Type: No value filed.    Shi Phase I: Shi Score: 8    Shi Phase II: Shi Score: 10    Anesthesia Post Evaluation    Patient location during evaluation: PACU  Patient participation: complete - patient participated  Level of consciousness: awake  Airway patency: patent  Nausea & Vomiting: no nausea and no vomiting  Cardiovascular status: blood pressure returned to baseline and hemodynamically stable  Respiratory status: acceptable  Hydration status: euvolemic  Multimodal analgesia pain management approach  Pain management: adequate    No notable events documented.

## 2024-07-08 ENCOUNTER — TELEPHONE (OUTPATIENT)
Dept: ENT CLINIC | Age: 49
End: 2024-07-08

## 2024-07-08 NOTE — TELEPHONE ENCOUNTER
Called and informed patient that at tomorrow visit with Dr Price will be able to tell when to blow nose. Informed patient not to blow nose until OV tomorrow. Verbalized understanding.

## 2024-07-08 NOTE — TELEPHONE ENCOUNTER
The patient would like to see when he's able to blow his nose.    Please give him a call back, thanks!

## 2024-07-09 ENCOUNTER — OFFICE VISIT (OUTPATIENT)
Dept: ENT CLINIC | Age: 49
End: 2024-07-09
Payer: COMMERCIAL

## 2024-07-09 VITALS — DIASTOLIC BLOOD PRESSURE: 86 MMHG | TEMPERATURE: 97.3 F | HEART RATE: 92 BPM | SYSTOLIC BLOOD PRESSURE: 128 MMHG

## 2024-07-09 DIAGNOSIS — Z48.89 POSTOPERATIVE VISIT: ICD-10-CM

## 2024-07-09 DIAGNOSIS — J32.4 CHRONIC PANSINUSITIS: Primary | ICD-10-CM

## 2024-07-09 PROCEDURE — 31237 NSL/SINS NDSC SURG BX POLYPC: CPT | Performed by: OTOLARYNGOLOGY

## 2024-07-09 NOTE — PROGRESS NOTES
Patient s/p FESS, septum and turbs. Doing well. Pressure in face.     PE: Splints removed, nasal cavity suctioned. Middle meatal crusting and thick mucoid debris. Debrided.       Nasal Endoscopy with Debridement  Pre Op Dx: Nasal congestion, post operative sinus surgery  Post Op Dx: Same  Procedure: Nasal endoscopy with debridement bilateral  Anesthesia: 2% lidocaine with afrin tiopical  EBL: None  Specimens: None  Findings: both sides nasal cavity and sinus crusting and mucus.     Procedure:    After the application of afrin and pontocaine the nasal sinus cavity was debrided utilizing a zero degree tony endoscope with Kerrison rongeurs and garcia suctions on both sides.  The sinus lining was healing well. No evidence of bleeding or rhinorrhea was noted. Tolerated well.    Complications: None    A/P: Nasal sinus rinses and sprays.   Follow up in 4 weeks.

## 2024-07-15 ENCOUNTER — TELEPHONE (OUTPATIENT)
Dept: ENT CLINIC | Age: 49
End: 2024-07-15

## 2024-07-15 NOTE — TELEPHONE ENCOUNTER
Patient called with concerns that left ear is clogged  no drainage, but some sinus pressure  please advise , please call patient

## 2024-07-16 NOTE — TELEPHONE ENCOUNTER
Called and informed patient of Dr Price recommendation of Sudafed for a few days. Verbalized understanding.

## 2024-08-01 ENCOUNTER — OFFICE VISIT (OUTPATIENT)
Dept: ENT CLINIC | Age: 49
End: 2024-08-01
Payer: COMMERCIAL

## 2024-08-01 ENCOUNTER — PROCEDURE VISIT (OUTPATIENT)
Dept: AUDIOLOGY | Age: 49
End: 2024-08-01
Payer: COMMERCIAL

## 2024-08-01 VITALS
RESPIRATION RATE: 16 BRPM | WEIGHT: 190 LBS | BODY MASS INDEX: 26.6 KG/M2 | SYSTOLIC BLOOD PRESSURE: 132 MMHG | TEMPERATURE: 97.3 F | HEART RATE: 88 BPM | HEIGHT: 71 IN | DIASTOLIC BLOOD PRESSURE: 83 MMHG

## 2024-08-01 DIAGNOSIS — H93.8X3 PRESSURE SENSATION IN BOTH EARS: ICD-10-CM

## 2024-08-01 DIAGNOSIS — H65.02 NON-RECURRENT ACUTE SEROUS OTITIS MEDIA OF LEFT EAR: Primary | ICD-10-CM

## 2024-08-01 DIAGNOSIS — H90.A32 MIXED CONDUCTIVE AND SENSORINEURAL HEARING LOSS OF LEFT EAR WITH RESTRICTED HEARING OF RIGHT EAR: Primary | ICD-10-CM

## 2024-08-01 DIAGNOSIS — H90.A21 SENSORINEURAL HEARING LOSS (SNHL) OF RIGHT EAR WITH RESTRICTED HEARING OF LEFT EAR: ICD-10-CM

## 2024-08-01 PROCEDURE — 99213 OFFICE O/P EST LOW 20 MIN: CPT | Performed by: OTOLARYNGOLOGY

## 2024-08-01 PROCEDURE — 92567 TYMPANOMETRY: CPT | Performed by: AUDIOLOGIST

## 2024-08-01 PROCEDURE — 92557 COMPREHENSIVE HEARING TEST: CPT | Performed by: AUDIOLOGIST

## 2024-08-01 RX ORDER — DEXAMETHASONE 4 MG/1
4 TABLET ORAL 2 TIMES DAILY WITH MEALS
COMMUNITY

## 2024-08-01 RX ORDER — CEFUROXIME AXETIL 500 MG/1
500 TABLET ORAL 2 TIMES DAILY
COMMUNITY

## 2024-08-01 RX ORDER — AZELASTINE 1 MG/ML
1 SPRAY, METERED NASAL 2 TIMES DAILY
COMMUNITY

## 2024-08-01 ASSESSMENT — ENCOUNTER SYMPTOMS
EYE PAIN: 0
EYE REDNESS: 0
SINUS PRESSURE: 0
SINUS PAIN: 0
CHOKING: 0
COUGH: 0
TROUBLE SWALLOWING: 0
RHINORRHEA: 0
DIARRHEA: 0
SORE THROAT: 0
VOICE CHANGE: 0
SHORTNESS OF BREATH: 0
FACIAL SWELLING: 0
EYE ITCHING: 0
NAUSEA: 0

## 2024-08-01 NOTE — PROGRESS NOTES
Subjective:      Patient ID: Emre Rodriguez is a 49 y.o. male.    HPI  Hearing Loss HPI  CC: hearing loss    General: Emre is a(n) 49 y.o. male who presents with a 5 day history of left sided hearing loss. No otalgia. Went ot urgent care. Diagnosed with OME. Placed on steroids and cefuroxime. Here for evaluation.       Patient Active Problem List   Diagnosis    Chronic pansinusitis    Nasal turbinate hypertrophy    Deviated nasal septum    Nasal sinus polyp     Past Surgical History:   Procedure Laterality Date    SINUS ENDOSCOPY Bilateral 7/5/2024    BILATERAL FRONTAL SINUS EXPLORATION, BILATERAL INFERIOR TURBINATE REDUCTION, BILATERAL MAXILLARY ANTROSTOMY WITH TISSUE REMOVAL, BILATERAL TOTAL ETHMOIDECTOMY WITH SPHENOIDECTOMY AND SEPTOPLASTY, BILATERAL performed by Matthew Price MD at [facility] OR     No family history on file.  Social History     Socioeconomic History    Marital status:      Spouse name: Not on file    Number of children: Not on file    Years of education: Not on file    Highest education level: Not on file   Occupational History    Not on file   Tobacco Use    Smoking status: Every Day     Current packs/day: 0.25     Average packs/day: 0.3 packs/day for 29.6 years (7.4 ttl pk-yrs)     Types: Cigarettes     Start date: 1995    Smokeless tobacco: Never    Tobacco comments:     6/26/24 Ready to quit and is purchasing nicotine patches   Vaping Use    Vaping Use: Former    Quit date: 4/26/2024    Substances: Nicotine   Substance and Sexual Activity    Alcohol use: Yes     Comment: social    Drug use: Not Currently    Sexual activity: Not on file   Other Topics Concern    Not on file   Social History Narrative    Not on file     Social Determinants of Health     Financial Resource Strain: Not on file   Food Insecurity: Not on file   Transportation Needs: Not on file   Physical Activity: Not on file   Stress: Not on file   Social Connections: Not on file   Intimate Partner Violence: Not on

## 2024-08-01 NOTE — PROGRESS NOTES
Emre Rodriguez   1975, 49 y.o. male   [id number]       Referring Provider: Matthew Price MD, PhD  Referral Type: Referring provider encounter note    Reason for Visit: Evaluation of suspected change in hearing, tinnitus, or balance.      ADULT AUDIOLOGIC EVALUATION      Emre Rodriguez is a 49 y.o. male seen today, 8/1/2024, for an initial audiologic evaluation.     AUDIOLOGIC AND OTHER PERTINENT MEDICAL HISTORY:        Emre Rodriguez noted concern for LE>RE fullness, ongoing for months, left ear seems to be improving, but still not back to his baseline, was seen at urgent care, but the symptoms are persisting; he noted his left ear did pop open at one time, and he did have some dizziness that day.      Emre Rodriguez denied otalgia, otorrhea, and tinnitus.    IMPRESSIONS:       Today's results are consistent with left ear mixed hearing loss with flat tympanogram and right ear sensorineural hearing loss with negative middle ear pressure; both ears with excellent word recognition.  Hearing loss is significant enough to result in difficulty understanding speech in at least some listening environments.  Follow medical recommendations from Dr. Price.    ASSESSMENT AND FINDINGS:       Otoscopy revealed: Clear ear canals bilaterally      RIGHT EAR:  Hearing Sensitivity: Mild sensorineural hearing loss rising to within normal limits.  Speech Recognition Threshold: 35 dBHL  Word Recognition: Excellent (100%), based on NU-6 25-word list at 60 dBHL, masked, using recorded speech stimuli.    Tympanometry: Negative peak pressure with normal compliance, Type C tympanogram, consistent with ETD/history of otitis media.      LEFT EAR:  Hearing Sensitivity: Mild to moderate mixed hearing loss.  Speech Recognition Threshold: 50 dBHL  Word Recognition: Excellent (96%), based on NU-6 25-word list at 70 dBHL,masked, using recorded speech stimuli.    Tympanometry: Flat, no peak pressure or compliance, Type B tympanogram, with

## 2024-08-06 ENCOUNTER — OFFICE VISIT (OUTPATIENT)
Dept: ENT CLINIC | Age: 49
End: 2024-08-06
Payer: COMMERCIAL

## 2024-08-06 VITALS — SYSTOLIC BLOOD PRESSURE: 144 MMHG | TEMPERATURE: 97.1 F | HEART RATE: 93 BPM | DIASTOLIC BLOOD PRESSURE: 88 MMHG

## 2024-08-06 DIAGNOSIS — J32.4 CHRONIC PANSINUSITIS: Primary | ICD-10-CM

## 2024-08-06 PROCEDURE — 31237 NSL/SINS NDSC SURG BX POLYPC: CPT | Performed by: OTOLARYNGOLOGY

## 2024-08-06 PROCEDURE — 99024 POSTOP FOLLOW-UP VISIT: CPT | Performed by: OTOLARYNGOLOGY

## 2024-08-06 NOTE — PROGRESS NOTES
Patient here for sinus follow up. Surgery 7-5-24. Doing well. Mild congestion. Left ear resolved. Rinsing twice a day.     PE: Minimal fluid left middle ear. Much improved.   Bilateral middle meatal crusting.       Nasal Endoscopy with Debridement  Pre Op Dx: Nasal congestion, post operative sinus surgery  Post Op Dx: Same  Procedure: Nasal endoscopy with debridement bilateral  Anesthesia: 2% lidocaine with afrin tiopical  EBL: None  Specimens: None  Findings: both sides nasal cavity and sinus crusting and mucus.     Procedure:    After the application of afrin and pontocaine the nasal sinus cavity was debrided utilizing a zero degree tony endoscope with Kerrison rongeurs and garcia suctions on both sides.  The sinus lining was healing well. No evidence of bleeding or rhinorrhea was noted. Tolerated well.    Complications: None    A/P: BID rinses.   Follow up in 3 months.

## 2024-11-11 ENCOUNTER — OFFICE VISIT (OUTPATIENT)
Dept: ENT CLINIC | Age: 49
End: 2024-11-11
Payer: COMMERCIAL

## 2024-11-11 VITALS
BODY MASS INDEX: 26.94 KG/M2 | TEMPERATURE: 96.9 F | DIASTOLIC BLOOD PRESSURE: 88 MMHG | HEART RATE: 98 BPM | HEIGHT: 71 IN | WEIGHT: 192.4 LBS | SYSTOLIC BLOOD PRESSURE: 162 MMHG

## 2024-11-11 DIAGNOSIS — J32.4 CHRONIC PANSINUSITIS: Primary | ICD-10-CM

## 2024-11-11 PROCEDURE — 31231 NASAL ENDOSCOPY DX: CPT | Performed by: OTOLARYNGOLOGY

## 2024-11-11 RX ORDER — FLUTICASONE PROPIONATE 50 MCG
1 SPRAY, SUSPENSION (ML) NASAL DAILY
Qty: 1 EACH | Refills: 5 | Status: SHIPPED | OUTPATIENT
Start: 2024-11-11 | End: 2024-12-11

## 2025-01-13 ENCOUNTER — OFFICE VISIT (OUTPATIENT)
Dept: ENT CLINIC | Age: 50
End: 2025-01-13
Payer: COMMERCIAL

## 2025-01-13 VITALS
DIASTOLIC BLOOD PRESSURE: 78 MMHG | HEIGHT: 71 IN | SYSTOLIC BLOOD PRESSURE: 137 MMHG | HEART RATE: 89 BPM | TEMPERATURE: 96.9 F | BODY MASS INDEX: 27.5 KG/M2 | WEIGHT: 196.4 LBS

## 2025-01-13 DIAGNOSIS — J32.4 CHRONIC PANSINUSITIS: ICD-10-CM

## 2025-01-13 DIAGNOSIS — J33.8 NASAL SINUS POLYP: Primary | ICD-10-CM

## 2025-01-13 PROCEDURE — 99213 OFFICE O/P EST LOW 20 MIN: CPT | Performed by: OTOLARYNGOLOGY

## 2025-01-13 RX ORDER — MOMETASONE FUROATE MONOHYDRATE 50 UG/1
2 SPRAY, METERED NASAL DAILY
Qty: 3 EACH | Refills: 3 | Status: SHIPPED | OUTPATIENT
Start: 2025-01-13 | End: 2026-01-08

## 2025-01-13 ASSESSMENT — ENCOUNTER SYMPTOMS
RHINORRHEA: 0
NAUSEA: 0
FACIAL SWELLING: 0
CHOKING: 0
VOICE CHANGE: 0
EYE PAIN: 0
SHORTNESS OF BREATH: 0
SINUS PAIN: 0
TROUBLE SWALLOWING: 0
SORE THROAT: 0
EYE ITCHING: 0
DIARRHEA: 0
COUGH: 0
SINUS PRESSURE: 0
EYE REDNESS: 0

## 2025-01-13 NOTE — PROGRESS NOTES
Subjective:      Patient ID: Emre Rodriguez is a 49 y.o. male.    HPI  Chief Complaint   Patient presents with    Follow up CRS  History of Present Illness:Emre is a(n) 49 y.o. male who presents with a 3 month follow up CRS. Doing well. Needs refill on mometasone. Flonase too drying. Using rinses BID. Provided recipe as he is making own salt. Discussed dry nose and products that work well.     Patient Active Problem List   Diagnosis    Chronic pansinusitis    Nasal turbinate hypertrophy    Deviated nasal septum    Nasal sinus polyp     Past Surgical History:   Procedure Laterality Date    SINUS ENDOSCOPY Bilateral 7/5/2024    BILATERAL FRONTAL SINUS EXPLORATION, BILATERAL INFERIOR TURBINATE REDUCTION, BILATERAL MAXILLARY ANTROSTOMY WITH TISSUE REMOVAL, BILATERAL TOTAL ETHMOIDECTOMY WITH SPHENOIDECTOMY AND SEPTOPLASTY, BILATERAL performed by Matthew Price MD at [facility] OR     No family history on file.  Social History     Socioeconomic History    Marital status:      Spouse name: Not on file    Number of children: Not on file    Years of education: Not on file    Highest education level: Not on file   Occupational History    Not on file   Tobacco Use    Smoking status: Every Day     Current packs/day: 0.25     Average packs/day: 0.3 packs/day for 30.0 years (7.5 ttl pk-yrs)     Types: Cigarettes     Start date: 1995    Smokeless tobacco: Never    Tobacco comments:     6/26/24 Ready to quit and is purchasing nicotine patches   Vaping Use    Vaping status: Former    Quit date: 4/26/2024    Substances: Nicotine   Substance and Sexual Activity    Alcohol use: Yes     Comment: social    Drug use: Not Currently    Sexual activity: Not on file   Other Topics Concern    Not on file   Social History Narrative    Not on file     Social Determinants of Health     Financial Resource Strain: Not on file   Food Insecurity: No Transportation Needs (4/1/2024)    [facility]  [facility],

## (undated) DEVICE — SHEATH 1912000 5PK 4MM/0DEG STORZ XOMED: Brand: ENDO-SCRUB®

## (undated) DEVICE — BLADE,CARBON-STEEL,15,STRL,DISPOSABLE,TB: Brand: MEDLINE

## (undated) DEVICE — SOLUTION IV 250ML 0.9% SOD CHL PH 5 INJ USP VIAFLX PLAS

## (undated) DEVICE — FIRM 8CM: Brand: NASOPORE

## (undated) DEVICE — SPONGE,NEURO,0.5"X3",XR,STRL,LF,10/PK: Brand: MEDLINE

## (undated) DEVICE — TOWEL,STOP FLAG GOLD N-W: Brand: MEDLINE

## (undated) DEVICE — BLADE 1882940HR 5PK M4 INF TURB 2.9MM: Brand: STRAIGHTSHOT

## (undated) DEVICE — SUTURE PROL SZ 2-0 L30IN NONABSORBABLE BLU L26MM CT-2 1/2 8411H

## (undated) DEVICE — BLADE 1884004HR TRICUT 5PK M4 4MM ROTATE: Brand: TRICUT

## (undated) DEVICE — TUBING, SUCTION, 1/4" X 12', STRAIGHT: Brand: MEDLINE

## (undated) DEVICE — NEPTUNE E-SEP SMOKE EVACUATION PENCIL, COATED, 70MM BLADE, PUSH BUTTON SWITCH: Brand: NEPTUNE E-SEP

## (undated) DEVICE — GLOVE ORANGE PI 7 1/2   MSG9075

## (undated) DEVICE — ELECTRODE PT RET AD L9FT HI MOIST COND ADH HYDRGEL CORDED

## (undated) DEVICE — NEEDLE SPNL 25GA L3.5IN BLU HUB S STL REG WALL FIT STYL W/

## (undated) DEVICE — DRAPE IRRIG FLD WRM W44XL44IN W/ AORN STD PRTBL INTRATEMP

## (undated) DEVICE — SUTURE PROL SZ 3-0 L36IN NONABSORBABLE BLU L26MM SH 1/2 CIR 8522H

## (undated) DEVICE — DRAPE,INSTRUMENT,MAGNETIC,10X16: Brand: MEDLINE

## (undated) DEVICE — TRAP FLUID

## (undated) DEVICE — SUTURE ABSORBABLE MONOFILAMENT 4-0 SC-1 18 IN PLN GUT 1824H

## (undated) DEVICE — BLADE 1884006HR RAD40 5PK M4 4MM ROTATE: Brand: RAD

## (undated) DEVICE — TOWEL,OR,DSP,ST,BLUE,DLX,8/PK,10PK/CS: Brand: MEDLINE

## (undated) DEVICE — SPECIMEN SOCK - FEMALE: Brand: MEDI-VAC

## (undated) DEVICE — NASAL FESS: Brand: MEDLINE INDUSTRIES, INC.